# Patient Record
Sex: FEMALE | Race: WHITE
[De-identification: names, ages, dates, MRNs, and addresses within clinical notes are randomized per-mention and may not be internally consistent; named-entity substitution may affect disease eponyms.]

---

## 2020-01-07 LAB
ALBUMIN SERPL-MCNC: 4.1 G/DL (ref 3.4–5)
ANION GAP SERPL CALC-SCNC: 9 MMOL/L (ref 7–16)
BILIRUB UR-MCNC: NEGATIVE MG/DL
BUN SERPL-MCNC: 43 MG/DL (ref 7–18)
CALCIUM SERPL-MCNC: 9.6 MG/DL (ref 8.5–10.1)
CHLORIDE SERPL-SCNC: 105 MMOL/L (ref 98–107)
CO2 SERPL-SCNC: 29 MMOL/L (ref 21–32)
COLOR UR: YELLOW
CREAT SERPL-MCNC: 1.4 MG/DL (ref 0.6–1)
ERYTHROCYTE [DISTWIDTH] IN BLOOD BY AUTOMATED COUNT: 13.3 % (ref 10.5–14.5)
GLUCOSE SERPL-MCNC: 157 MG/DL (ref 74–106)
HCT VFR BLD CALC: 40.5 % (ref 37–47)
HGB BLD-MCNC: 13.1 GM/DL (ref 12–15)
INR PPP: 1
KETONES UR STRIP-MCNC: NEGATIVE MG/DL
MCH RBC QN AUTO: 29.9 PG (ref 26–34)
MCHC RBC AUTO-ENTMCNC: 32.3 G/DL (ref 28–37)
MCV RBC: 92.5 FL (ref 80–100)
MUCUS: (no result) STRN/LPF
PLATELET # BLD: 306 THOU/UL (ref 150–400)
POTASSIUM SERPL-SCNC: 4.3 MMOL/L (ref 3.5–5.1)
PROTHROMBIN TIME: 10.2 SECONDS (ref 9.3–11.4)
RBC # BLD AUTO: 4.38 MIL/UL (ref 4.2–5)
RBC # UR STRIP: NEGATIVE /UL
RBC #/AREA URNS HPF: (no result) /HPF (ref 0–2)
SODIUM SERPL-SCNC: 143 MMOL/L (ref 136–145)
SP GR UR STRIP: >= 1.03 (ref 1–1.03)
SQUAMOUS: (no result) /LPF (ref 0–3)
URINE CLARITY: CLEAR
URINE GLUCOSE-RANDOM*: NEGATIVE
URINE LEUKOCYTES-REFLEX: (no result)
URINE NITRITE-REFLEX: NEGATIVE
URINE PROTEIN (DIPSTICK): NEGATIVE
UROBILINOGEN UR STRIP-ACNC: 0.2 E.U./DL (ref 0.2–1)
WBC # BLD AUTO: 9.3 THOU/UL (ref 4–11)

## 2020-01-07 NOTE — EKG
[place] [address] [employer] [place]  [address]
Phone:  [phone]                    ELECTROCARDIOGRAM REPORT      
_______________________________________________________________________________
 
Name:       BHARGAVINEVAEHMARISSA           Room #:                     PRE IN  
M.R.#:      [id number]     Account #:      [id number]  
Admission:              Attend Phys:    Hussain Fishman MD  
Discharge:              Date of Birth:  [birth year]  
                                                          Report #: [id number]
   [id number]
_______________________________________________________________________________
THIS REPORT FOR:   //name//                          
 
                          [facility]
                                       
Test Date:    2020               Test Time:    09:34:32
Pat Name:     MARISSA ROBLES        Department:   
Patient ID:   [id number]            Room:          
Gender:       F                        Technician:   KENDELL BRUNSON
:          1940               Requested By: Hussain Fishman
Order Number: [id number] MD:   Que Bermudez
                                 Measurements
Intervals                              Axis          
Rate:         72                       P:            -39
MN:           155                      QRS:          -5
QRSD:         81                       T:            30
QT:           380                                    
QTc:          416                                    
                           Interpretive Statements
Sinus rhythm
Normal tracing
No previous ECG available for comparison
 
Electronically Signed On 2020 13:10:16 CST by Que Bermudez
[email or web address]
 
 
 
 
 
 
 
 
 
 
 
 
 
 
 
 
 
 
 
  <ELECTRONICALLY SIGNED>
   By: Que Bermudez MD, [facility]   
  20     1310
D: 20 0934                           _____________________________________
T: 20                           Que Bermudez MD, FACC     /EPI

## 2020-01-09 LAB
BILIRUB UR-MCNC: NEGATIVE MG/DL
COLOR UR: YELLOW
KETONES UR STRIP-MCNC: NEGATIVE MG/DL
RBC # UR STRIP: NEGATIVE /UL
RBC #/AREA URNS HPF: (no result) /HPF (ref 0–2)
SP GR UR STRIP: 1.02 (ref 1–1.03)
SQUAMOUS: (no result) /LPF (ref 0–3)
URINE CLARITY: CLEAR
URINE GLUCOSE-RANDOM*: NEGATIVE
URINE LEUKOCYTES-REFLEX: (no result)
URINE NITRITE-REFLEX: NEGATIVE
URINE PROTEIN (DIPSTICK): (no result)
URINE WBC-REFLEX: (no result) /HPF (ref 0–5)
UROBILINOGEN UR STRIP-ACNC: 0.2 E.U./DL (ref 0.2–1)

## 2020-01-21 ENCOUNTER — HOSPITAL ENCOUNTER (OUTPATIENT)
Dept: HOSPITAL 35 - OR | Age: 80
LOS: 1 days | Discharge: HOME | End: 2020-01-22
Attending: ORTHOPAEDIC SURGERY
Payer: COMMERCIAL

## 2020-01-21 VITALS — DIASTOLIC BLOOD PRESSURE: 59 MMHG | SYSTOLIC BLOOD PRESSURE: 136 MMHG

## 2020-01-21 VITALS — DIASTOLIC BLOOD PRESSURE: 69 MMHG | SYSTOLIC BLOOD PRESSURE: 139 MMHG

## 2020-01-21 VITALS — DIASTOLIC BLOOD PRESSURE: 50 MMHG | SYSTOLIC BLOOD PRESSURE: 118 MMHG

## 2020-01-21 VITALS — SYSTOLIC BLOOD PRESSURE: 136 MMHG | DIASTOLIC BLOOD PRESSURE: 6 MMHG

## 2020-01-21 VITALS — SYSTOLIC BLOOD PRESSURE: 136 MMHG | DIASTOLIC BLOOD PRESSURE: 59 MMHG

## 2020-01-21 VITALS — BODY MASS INDEX: 27.62 KG/M2 | WEIGHT: 152 LBS | HEIGHT: 62.01 IN

## 2020-01-21 VITALS — SYSTOLIC BLOOD PRESSURE: 134 MMHG | DIASTOLIC BLOOD PRESSURE: 66 MMHG

## 2020-01-21 VITALS — DIASTOLIC BLOOD PRESSURE: 78 MMHG | SYSTOLIC BLOOD PRESSURE: 95 MMHG

## 2020-01-21 DIAGNOSIS — Z98.42: ICD-10-CM

## 2020-01-21 DIAGNOSIS — Z79.899: ICD-10-CM

## 2020-01-21 DIAGNOSIS — M17.12: Primary | ICD-10-CM

## 2020-01-21 DIAGNOSIS — I10: ICD-10-CM

## 2020-01-21 DIAGNOSIS — E66.3: ICD-10-CM

## 2020-01-21 DIAGNOSIS — Z87.891: ICD-10-CM

## 2020-01-21 DIAGNOSIS — M25.562: ICD-10-CM

## 2020-01-21 DIAGNOSIS — Z98.84: ICD-10-CM

## 2020-01-21 DIAGNOSIS — Z79.82: ICD-10-CM

## 2020-01-21 DIAGNOSIS — Z98.890: ICD-10-CM

## 2020-01-21 LAB
BACTERIA #/AREA URNS HPF: (no result) /HPF
BILIRUB UR-MCNC: NEGATIVE MG/DL
COLOR UR: YELLOW
KETONES UR STRIP-MCNC: NEGATIVE MG/DL
MUCUS: (no result) STRN/LPF
NITRITE UR QL STRIP: NEGATIVE
RBC # UR STRIP: NEGATIVE /UL
SP GR UR STRIP: 1.02 (ref 1–1.03)
SQUAMOUS: (no result) /LPF (ref 0–3)
URINE CLARITY: CLEAR
URINE GLUCOSE-RANDOM*: NEGATIVE
URINE LEUKOCYTES: (no result)
URINE PROTEIN (DIPSTICK): NEGATIVE
UROBILINOGEN UR STRIP-ACNC: 0.2 E.U./DL (ref 0.2–1)
WBC #/AREA URNS HPF: (no result) /HPF (ref 0–5)

## 2020-01-21 PROCEDURE — 51130: CPT

## 2020-01-21 PROCEDURE — 52001 CYSTO W/IRRG&EVAC MLT CLOTS: CPT

## 2020-01-21 PROCEDURE — 50010 RENAL EXPLORATION: CPT

## 2020-01-21 PROCEDURE — 53078: CPT

## 2020-01-21 PROCEDURE — 65060: CPT

## 2020-01-21 PROCEDURE — 57095: CPT

## 2020-01-21 PROCEDURE — 64043: CPT

## 2020-01-21 PROCEDURE — 70005: CPT

## 2020-01-21 PROCEDURE — 51320: CPT

## 2020-01-21 PROCEDURE — 57103: CPT

## 2020-01-21 PROCEDURE — 52282 CYSTOSCOPY IMPLANT STENT: CPT

## 2020-01-21 PROCEDURE — 54118: CPT

## 2020-01-21 PROCEDURE — 53364: CPT

## 2020-01-21 PROCEDURE — 50101: CPT

## 2020-01-21 PROCEDURE — 62900: CPT

## 2020-01-21 PROCEDURE — 50415: CPT

## 2020-01-21 PROCEDURE — 56527: CPT

## 2020-01-21 PROCEDURE — 62110: CPT

## 2020-01-21 PROCEDURE — 57180 TREAT VAGINAL BLEEDING: CPT

## 2020-01-21 PROCEDURE — 27447 TOTAL KNEE ARTHROPLASTY: CPT

## 2020-01-21 PROCEDURE — 57127: CPT

## 2020-01-21 PROCEDURE — 53000 INCISION OF URETHRA: CPT

## 2020-01-21 PROCEDURE — 50954: CPT

## 2020-01-21 PROCEDURE — 56528: CPT

## 2020-01-21 PROCEDURE — 57110 VAGNC COMPL RMVL VAG WALL: CPT

## 2020-01-21 PROCEDURE — 51225: CPT

## 2020-01-21 NOTE — NUR
PT CARE ASSUMED AT 1300. A&Ox4. PT IS POST OP WITH A CLAUDINE DRESSING, SCD'S,
TEDHOSES, AND POLAR PACK. PT HAD TO HAVE A NEW IV PLACED. MANDI BUENO WAS DC'D. NEW
IV IS R. AC. PT HAS BEEN UP WITH PT AND BECAME A LITTLE DIZZY BUT WALKED TO
THE DOOR AND BACK. PT IS UP WITH [facility] AND WALKER. FLUIDS INFUSING. IV
ANTIBIOTICS INFUSING. ADMISSION COMPLETE.

## 2020-01-22 VITALS — SYSTOLIC BLOOD PRESSURE: 125 MMHG | DIASTOLIC BLOOD PRESSURE: 69 MMHG

## 2020-01-22 VITALS — DIASTOLIC BLOOD PRESSURE: 69 MMHG | SYSTOLIC BLOOD PRESSURE: 125 MMHG

## 2020-01-22 VITALS — DIASTOLIC BLOOD PRESSURE: 64 MMHG | SYSTOLIC BLOOD PRESSURE: 109 MMHG

## 2020-01-22 LAB
ERYTHROCYTE [DISTWIDTH] IN BLOOD BY AUTOMATED COUNT: 13.4 % (ref 10.5–14.5)
HCT VFR BLD CALC: 33.3 % (ref 37–47)
HGB BLD-MCNC: 11 GM/DL (ref 12–15)
MCH RBC QN AUTO: 30.6 PG (ref 26–34)
MCHC RBC AUTO-ENTMCNC: 33 G/DL (ref 28–37)
MCV RBC: 93 FL (ref 80–100)
PLATELET # BLD: 240 THOU/UL (ref 150–400)
RBC # BLD AUTO: 3.58 MIL/UL (ref 4.2–5)
WBC # BLD AUTO: 12.7 THOU/UL (ref 4–11)

## 2020-01-22 NOTE — NUR
FAXED REFERRAL TO [facility] FOR A FWW SPOKE WITH SHAYY AT [facility] HE RECEIVED
REFERRAL AND WILL DELIVER WALKER PRIOR TO DC TODAY.

## 2020-01-22 NOTE — NUR
ASSESSED AT START OF SHIFT A&OX4 IV INTACT AND FLUIDS INFUISING UP WITH
ASSISTX1 TO THE BATHROOM. CLAUDINE DRESSING AND POLAR PACK INTACT. PAIN MED
GIVENX1 THIS SHIFT. FALL PREC IN PLS AND CALL LIGHT IN REACH WILL CONT TO
MONITOR TILL EOS.

## 2020-01-22 NOTE — NUR
Pt is s/p tkr with anticipated dc to home this afternoon. Writer visited with
the pt and one of her dtrs (flor) at bedside. She indicates that she lives
alone and her home has two steps to enter. Her dtr lives a mile away and can
help as needed. She prefers  HH therapy as she anticipates being homebound for
a couple of weeks. Transition to outpt discussed. She does not have a rwalker.
Options for hh f/u and dme discussed. Pt denies preference as long as the
providers are in network with her insurance plan. Referral for a rwalker
called to both [facility] and [facility]. [facility] is in network and can
deliever one to her room prior to dc today. DC planner to fax referral to both
[facility] and [facility] to verify they accept her ins plan. Pt's dtr
can take her home today. Care team  updated.

## 2020-01-22 NOTE — NUR
PT CARE ASSUMED AT 0700. A&Ox4. PT IS UP WITH PHYSICAL THERAPY AND TOLERATES
THIS WELL. PT WILL DO THE STAIRS THIS AFTERNOON BEFORE DISCHARGING. PT IS A
OBSERVATION PATIENT. VITALS ARE STABLE. CLAUDINE DRESSING INTACT.
POLARPACK/SCD'S/TEDHOSES IN PLACE. PT HAD A GOOD MORNING. IV PATENT WITH NO
REDNESS OR SWELLING. IV FLUIDS COMPLEETED. BED IN LOW POSITION, BED LOCKED
WITH BED ALARM IN PLACE. CALL LIGHT IN REACH.

## 2020-01-22 NOTE — O
[facility]
[address]
[place]   [address]                     OPERATIVE REPORT              
_______________________________________________________________________________
 
Name:       MARISSA ROBLES           Room #:         443-P       [facility]..#:      [id number]                       Account #:      [id number]  
Admission:  01/21/20    Attend Phys:    Hussain Fishman MD  
Discharge:              Date of Birth:  [birth year]  
                                                          Report #: [id number]
                                                                    [id number]   
_______________________________________________________________________________
THIS REPORT FOR:   //name//                          
 
CC: Richar Fishman
 
DATE OF SERVICE:  01/21/2020
 
 
PREOPERATIVE DIAGNOSIS:  Left knee valgus osteoarthritis.
 
POSTOPERATIVE DIAGNOSIS:  Left knee valgus osteoarthritis.
  
PROCEDURE:  Left total knee arthroplasty using Navio robotic assistant.
 
SURGEON:  Hussain Fishman MD.
 
ASSISTANT:  Vicky Dotson PA-C.
 
INDICATIONS FOR ASSISTANT:  Throughout the case, extensive retraction and
manipulation of the knee was required.  This was afforded to me by my assistant.
 
ANESTHESIA:  LMA with an adductor canal block.
 
IMPLANTS:  Cheema [employer] Nephew size 5 narrow Legion cobalt chrome posterior
stabilized femur, size 3 tibia, size 11 constrained polyethylene and size 32
patella.
 
TOURNIQUET TIME:  59 minutes.
 
ESTIMATED BLOOD LOSS:  25 mL.
 
COMPLICATIONS:  None.
 
SPECIMENS:  None.
 
CONDITION UPON LEAVING THE OPERATING ROOM:  Stable.
 
INDICATIONS FOR PROCEDURE:  The patient is an 79-year-old female with severe
left knee valgus osteoarthritis.  She had failed conservative measures for this
and after discussion with her, she elected for left total knee arthroplasty.
 
DESCRIPTION OF PROCEDURE:  Risks, benefits, alternatives, complications were
discussed in detail with the patient including but not limited to risk of
anesthesia, risk of damage to nerves, arteries, blood vessels, risk for
infection, bleeding, risk for continued knee pain, need for reoperation. 
Informed consent was obtained from the patient and the left knee was
 
 
 
[facility]
[address] [place]   [address]                     OPERATIVE REPORT              
_______________________________________________________________________________
 
Name:       MARISSA ROBLES           Room #:         443-P       [facility] 
M..#:      [id number]                       Account #:      [id number]  
Admission:  01/21/20    Attend Phys:    Hussain Fishman MD  
Discharge:              Date of Birth:  [birth year]  
                                                          Report #: [id number]
                                                                    [id number]   
_______________________________________________________________________________
appropriately marked in the preoperative holding area.  IV Ancef was given for
preoperative antibiotics.  Adductor canal block was placed by Anesthesia.  She
was brought to the operating room and placed in supine position on operating
room table.  LMA anesthesia was induced without complication.  Tourniquet was
placed on the left thigh.  Left lower extremity was prepped and draped in normal
sterile fashion.  Timeout was performed properly identifying the patient and
procedure as well as the instrumentation and implants.  All in the operating
room were in agreement.  Left lower extremity was exsanguinated, tourniquet was
inflated.  Tourniquet time was 59 minutes.  Standard midline approach to knee
was made with 10 blade through the skin.  Dissection was taken down sharply to
the fascia and deep flaps were developed medially and laterally.  Fresh 10 blade
was used to make a medial parapatellar arthrotomy and the knee was inspected. 
There was severe valgus osteoarthritic change.  ACL and PCL were removed
sharply.  Reference pins were placed in the femur and the tibia.  The knee was
then digitally mapped using the [employer] robotic system.  We sized the size 5 femur
and a size 3 tibia with an 11 spacer.  After acceptance of the intraoperative
plan, the distal femoral cut was made with a Navio bur.  The 4-in-1 cutting
block size 5 was then placed and anterior, posterior and chamfer cuts were made
on the femur.  After this, attention was turned to the tibia.  The remainder of
the menisci removed with Bovie cautery.  Tibia resection guide was pinned in
place using the Navio for placement and tibial resection was made.  After this,
flexion and extension gaps were checked and found to have good balance laterally
in flexion and extension medially.  She was loose and it was felt we could make
up for this with a constrained implant given her previous valgus deformity. 
After this, tibia was sized, found to be a size 3.  A size 3 tibial trial was
placed, pinned and punched.  A size 5 femoral trial was placed and the box cut
was made.  This was then trialed with a size 11 constrained polyethylene.  Knee
was taken through range of motion, found to be stable, found to have good
balance in flexion and extension both medially and laterally.  After this, 9 mm
was resected from the posterior surface of the patella and a size 32 patellar
trial button was placed.  Knee was taken through range of motion, found to be
stable, found to have good patellar tracking.  Trial components were removed. 
Bony ends were thoroughly irrigated with normal saline.  A final size 3 tibia,
size 5 narrow Legion cobalt chrome posterior stabilized femur and a size 32
patella were cemented in place using standard cementation techniques.  While the
cement cured, a periarticular injection consisting of morphine, ropivacaine,
epinephrine and Toradol was placed around the knee joint capsule.  After the
cement cured, the tourniquet was deflated.  Hemostasis was obtained with Bovie
cautery.  Final size 11 constrained polyethylene was placed.  A gram of
vancomycin was placed deep in the joint.  The fascia was closed with 0 Vicryl,
skin was closed with 2-0 Vicryl, 3-0 Monocryl.  Dermabond and a CLAUDINE dressing
 
 
 
[address]   [address]                     OPERATIVE REPORT              
_______________________________________________________________________________
 
Name:       MARISSA ROBLES           Room #:         443-P       [facility].#:      [id number]                       Account #:      [id number]  
Admission:  01/21/20    Attend Phys:    Hussain Fishman MD  
Discharge:              Date of Birth:  [birth year]  
                                                          Report #: [id number]
                                                                    [id number]   
_______________________________________________________________________________
was applied.  The patient tolerated this procedure well and went to recovery
room under care of anesthesia postoperatively.
 
 
 
 
 
 
 
 
 
 
 
 
 
 
 
 
 
 
 
 
 
 
 
 
 
 
 
 
 
 
 
 
 
 
 
 
 
 
 
 
 
 
  <ELECTRONICALLY SIGNED>
   By: Hussain Fishman MD          
  01/22/20     1414
D: 01/21/20 1140                           _____________________________________
T: 01/21/20 1148                           Hussain Fishman MD            /nt